# Patient Record
Sex: MALE | Race: WHITE | Employment: STUDENT | URBAN - METROPOLITAN AREA
[De-identification: names, ages, dates, MRNs, and addresses within clinical notes are randomized per-mention and may not be internally consistent; named-entity substitution may affect disease eponyms.]

---
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
## 2023-03-05 ENCOUNTER — EXTERNAL RECORD (OUTPATIENT)
Dept: HEALTH INFORMATION MANAGEMENT | Facility: OTHER | Age: 18
End: 2023-03-05

## 2023-03-09 ENCOUNTER — TELEPHONE (OUTPATIENT)
Dept: FAMILY MEDICINE | Age: 18
End: 2023-03-09

## 2023-03-15 ENCOUNTER — OFFICE VISIT (OUTPATIENT)
Dept: FAMILY MEDICINE | Age: 18
End: 2023-03-15

## 2023-03-15 VITALS
SYSTOLIC BLOOD PRESSURE: 116 MMHG | OXYGEN SATURATION: 98 % | WEIGHT: 154 LBS | HEART RATE: 83 BPM | TEMPERATURE: 98.3 F | BODY MASS INDEX: 20.86 KG/M2 | DIASTOLIC BLOOD PRESSURE: 70 MMHG | HEIGHT: 72 IN

## 2023-03-15 DIAGNOSIS — S01.01XA LACERATION OF SCALP WITHOUT FOREIGN BODY, INITIAL ENCOUNTER: Primary | ICD-10-CM

## 2023-03-15 PROCEDURE — 99213 OFFICE O/P EST LOW 20 MIN: CPT | Performed by: PHYSICIAN ASSISTANT

## 2023-03-15 ASSESSMENT — PATIENT HEALTH QUESTIONNAIRE - PHQ9
CLINICAL INTERPRETATION OF PHQ2 SCORE: NO FURTHER SCREENING NEEDED
2. FEELING DOWN, DEPRESSED OR HOPELESS: NOT AT ALL
SUM OF ALL RESPONSES TO PHQ9 QUESTIONS 1 AND 2: 0
SUM OF ALL RESPONSES TO PHQ9 QUESTIONS 1 AND 2: 0
1. LITTLE INTEREST OR PLEASURE IN DOING THINGS: NOT AT ALL

## 2023-05-21 ENCOUNTER — WALK IN (OUTPATIENT)
Dept: URGENT CARE | Age: 18
End: 2023-05-21

## 2023-05-21 VITALS
OXYGEN SATURATION: 97 % | RESPIRATION RATE: 16 BRPM | HEART RATE: 85 BPM | HEIGHT: 72 IN | TEMPERATURE: 98.9 F | DIASTOLIC BLOOD PRESSURE: 82 MMHG | WEIGHT: 155 LBS | BODY MASS INDEX: 20.99 KG/M2 | SYSTOLIC BLOOD PRESSURE: 114 MMHG

## 2023-05-21 DIAGNOSIS — H61.23 BILATERAL IMPACTED CERUMEN: Primary | ICD-10-CM

## 2023-05-21 ASSESSMENT — PAIN SCALES - GENERAL: PAINLEVEL: 0

## 2023-10-20 ENCOUNTER — APPOINTMENT (OUTPATIENT)
Dept: FAMILY MEDICINE | Age: 18
End: 2023-10-20

## 2024-07-19 ENCOUNTER — TELEPHONE (OUTPATIENT)
Dept: URGENT CARE | Age: 19
End: 2024-07-19

## 2024-07-19 ENCOUNTER — APPOINTMENT (OUTPATIENT)
Dept: URGENT CARE | Age: 19
End: 2024-07-19

## 2024-07-21 ENCOUNTER — WALK IN (OUTPATIENT)
Dept: URGENT CARE | Age: 19
End: 2024-07-21

## 2024-07-21 VITALS
TEMPERATURE: 98.3 F | RESPIRATION RATE: 18 BRPM | HEART RATE: 71 BPM | DIASTOLIC BLOOD PRESSURE: 72 MMHG | OXYGEN SATURATION: 99 % | SYSTOLIC BLOOD PRESSURE: 116 MMHG

## 2024-07-21 DIAGNOSIS — H61.21 IMPACTED CERUMEN OF RIGHT EAR: Primary | ICD-10-CM

## 2024-07-21 ASSESSMENT — PAIN SCALES - GENERAL: PAINLEVEL: 0

## 2024-07-24 ENCOUNTER — APPOINTMENT (OUTPATIENT)
Dept: FAMILY MEDICINE | Age: 19
End: 2024-07-24

## 2024-11-09 ENCOUNTER — HOSPITAL ENCOUNTER (EMERGENCY)
Age: 19
Discharge: HOME | End: 2024-11-09
Attending: EMERGENCY MEDICINE
Payer: COMMERCIAL

## 2024-11-09 VITALS
HEART RATE: 64 BPM | OXYGEN SATURATION: 98 % | HEIGHT: 73 IN | SYSTOLIC BLOOD PRESSURE: 104 MMHG | DIASTOLIC BLOOD PRESSURE: 70 MMHG | WEIGHT: 145 LBS | RESPIRATION RATE: 16 BRPM | BODY MASS INDEX: 19.22 KG/M2 | TEMPERATURE: 99 F

## 2024-11-09 DIAGNOSIS — T78.40XA ALLERGIC REACTION, INITIAL ENCOUNTER: Primary | ICD-10-CM

## 2024-11-09 PROCEDURE — 96374 THER/PROPH/DIAG INJ IV PUSH: CPT

## 2024-11-09 PROCEDURE — 96375 TX/PRO/DX INJ NEW DRUG ADDON: CPT

## 2024-11-09 PROCEDURE — 99284 EMERGENCY DEPT VISIT MOD MDM: CPT

## 2024-11-09 PROCEDURE — S0028 INJECTION, FAMOTIDINE, 20 MG: HCPCS | Performed by: EMERGENCY MEDICINE

## 2024-11-09 RX ORDER — EPINEPHRINE 0.3 MG/.3ML
0.3 INJECTION SUBCUTANEOUS
Qty: 1 EACH | Refills: 0 | Status: SHIPPED | OUTPATIENT
Start: 2024-11-09 | End: 2024-12-09

## 2024-11-09 RX ORDER — PREDNISONE 20 MG/1
40 TABLET ORAL DAILY
Qty: 10 TABLET | Refills: 0 | Status: SHIPPED | OUTPATIENT
Start: 2024-11-09 | End: 2024-11-14

## 2024-11-09 RX ORDER — DIPHENHYDRAMINE HYDROCHLORIDE 50 MG/ML
50 INJECTION INTRAMUSCULAR; INTRAVENOUS ONCE
Status: COMPLETED | OUTPATIENT
Start: 2024-11-09 | End: 2024-11-09

## 2024-11-09 RX ORDER — FAMOTIDINE 10 MG/ML
20 INJECTION, SOLUTION INTRAVENOUS ONCE
Status: COMPLETED | OUTPATIENT
Start: 2024-11-09 | End: 2024-11-09

## 2024-11-09 RX ORDER — METHYLPREDNISOLONE SODIUM SUCCINATE 125 MG/2ML
125 INJECTION INTRAMUSCULAR; INTRAVENOUS ONCE
Status: COMPLETED | OUTPATIENT
Start: 2024-11-09 | End: 2024-11-09

## 2024-11-09 NOTE — ED INITIAL ASSESSMENT (HPI)
Allergic reaction. Pt broke out in hives about 30 minutes ago after taking Creatine muscle-building powder. No juan. No swelling to tongue or lips.

## 2024-11-09 NOTE — ED PROVIDER NOTES
Patient Seen in: [facility] In [place]      History     Chief Complaint   Patient presents with    Allergic Rxn Allergies     Stated Complaint: Allergic reaction. Pt broke out in hives about 30 minutes ago after taking Crea*    Subjective:   HPI      19-year-old male presents emergency room for evaluation of allergic reaction.  Patient states that 30 minutes after using a health supplement, creatine, he started feeling itchy and broke out in rash/hives to the face extremities and torso.  Patient denies tongue or lip swelling.  Denies difficulty speaking or swallowing.  No change in phonation.  Denies chest pain or shortness of breath.  Denies any fevers.  Denies any other new foods, clothing, soaps, detergents, or medications.  Denies any prior history of allergic reaction    Objective:     History reviewed. No pertinent past medical history.           History reviewed. No pertinent surgical history.             Social History     Socioeconomic History    Marital status: Single   Tobacco Use    Smoking status: Never     Passive exposure: Never    Smokeless tobacco: Never   Vaping Use    Vaping status: Every Day   Substance and Sexual Activity    Alcohol use: Yes     Comment: social    Drug use: Never     Social Drivers of Health      Received from [facility]    Housing Stability                  Physical Exam     ED Triage Vitals [11/09/24 0043]   /80   Pulse 82   Resp 18   Temp 98.5 °F (36.9 °C)   Temp src Temporal   SpO2 98 %   O2 Device None (Room air)       Current Vitals:   Vital Signs  BP: 126/80  Pulse: 82  Resp: 18  Temp: 98.5 °F (36.9 °C)  Temp src: Temporal    Oxygen Therapy  SpO2: 98 %  O2 Device: None (Room air)        Physical Exam  GENERAL: Patient is awake, alert,  in no acute distress.  HEENT: no scleral icterus.  No tongue or lip swelling.  No mucous membrane rash/lesions.  Mucous membranes are moist, oropharynx is clear, uvula midline.    NECK: Neck  is supple, there is no nuchal rigidity. .  HEART: Regular rate and rhythm, no murmurs.  LUNGS: Clear to auscultation bilaterally.  No Rales, no rhonchi, no wheezing, no stridor.  ABDOMEN: Soft, nondistended,non tender  EXTREMITIES: No peripheral edema, no calf tenderness,  SKIN: To the face, torso and extremities there are diffuse erythematous hives, no vesicles or pustules, no petechia or purpura.  Negative Nikolsky sign.    ED Course   Labs Reviewed - No data to display                MDM        Differential diagnosis before testing includes but not limited to allergic reaction, contact dermatitis, which is a medical condition that poses a threat to life/function    Medications Provided: IV Benadryl, Solu-Medrol , Pepcid    Course of Events during Emergency Room Visit include IV established, patient placed on cardiac monitor and pulse ox.  Patient was given IV Benadryl, Solu-Medrol, Pepcid.  Patient was observed and reevaluated throughout ER stay, rash/hives have dissipated patient no longer feels itching.  I discussed with patient he appears to have reacted to the creatine, was instructed to avoid this in the future.  Will prescribe prednisone and patient to use over-the-counter Benadryl and Pepcid as directed.  Patient also was given prescription for EpiPen and discussed its use.  Close follow-up with primary care, return to ER if any change or worsening symptoms.  Patient well-appearing agrees plan was discharged with condition with    Shared decision making was utilized           Discharge  I have discussed with the patient the results of test, differential diagnosis, treatment plan, warning signs and symptoms which should prompt immediate return.  They expressed understanding of these instructions and agrees to the following plan provided.  They were given written discharge instructions and agrees to return for any concerns and voiced understanding and all questions were answered.    Note to patient: The 21st  Century Cures Act makes medical notes like these available to patients in the interest of transparency. However, this is a medical document intended as peer to peer communication. It is written in medical language and may contain abbreviations or verbiage that are unfamiliar. It may appear blunt or direct. Medical documents are intended to carry relevant information, facts as evident, and the clinical opinion of the practitioner.                 Medical Decision Making      Disposition and Plan     Clinical Impression:  1. Allergic reaction, initial encounter         Disposition:  Discharge  11/9/2024  1:55 am    Follow-up:  Rudy Quach MD  [address] [place] [id number]  [phone]    Follow up in 2 day(s)            Medications Prescribed:  Current Discharge Medication List        START taking these medications    Details   predniSONE 20 MG Oral Tab Take 2 tablets (40 mg total) by mouth daily for 5 days.  Qty: 10 tablet, Refills: 0      EPINEPHrine 0.3 MG/0.3ML Injection Solution Auto-injector Inject 0.3 mL (1 each total) into the muscle daily as needed.  Qty: 1 each, Refills: 0                 Supplementary Documentation:

## (undated) NOTE — LETTER
September 5, 2017    Patient: Stepan Marie   Date of Visit: 9/5/2017       To Whom It May Concern:    Bj Fabian was seen and treated in our emergency department on 9/5/2017.  He   may return to gym class and sports once cleared by orthopedic sp

## (undated) NOTE — ED AVS SNAPSHOT
Heidi Webster   MRN: [id number]    Department:  THE [facility] Emergency Department in [facility]   Date of Visit:  9/5/2017           Disclosure     Insurance plans vary and the physician(s) referred by the ER may not be covered by your plan.  Please contac If you have been prescribed any medication(s), please fill your prescription right away and begin taking the medication(s) as directed    If the emergency physician has read X-rays, these will be re-interpreted by a radiologist.  If there is a significant